# Patient Record
Sex: MALE | Race: WHITE | Employment: STUDENT | URBAN - METROPOLITAN AREA
[De-identification: names, ages, dates, MRNs, and addresses within clinical notes are randomized per-mention and may not be internally consistent; named-entity substitution may affect disease eponyms.]

---

## 2021-10-21 ENCOUNTER — HOSPITAL ENCOUNTER (EMERGENCY)
Age: 21
Discharge: HOME | End: 2021-10-21
Attending: EMERGENCY MEDICINE
Payer: COMMERCIAL

## 2021-10-21 ENCOUNTER — ANESTHESIA EVENT (OUTPATIENT)
Dept: ENDOSCOPY | Age: 21
End: 2021-10-21
Payer: COMMERCIAL

## 2021-10-21 ENCOUNTER — ANESTHESIA (OUTPATIENT)
Dept: ENDOSCOPY | Age: 21
End: 2021-10-21
Payer: COMMERCIAL

## 2021-10-21 VITALS
TEMPERATURE: 97.5 F | DIASTOLIC BLOOD PRESSURE: 76 MMHG | BODY MASS INDEX: 24.38 KG/M2 | RESPIRATION RATE: 14 BRPM | HEART RATE: 71 BPM | HEIGHT: 72 IN | OXYGEN SATURATION: 100 % | SYSTOLIC BLOOD PRESSURE: 122 MMHG | WEIGHT: 180 LBS

## 2021-10-21 VITALS — DIASTOLIC BLOOD PRESSURE: 71 MMHG | OXYGEN SATURATION: 94 % | SYSTOLIC BLOOD PRESSURE: 133 MMHG

## 2021-10-21 DIAGNOSIS — K20.90 ESOPHAGITIS: Primary | ICD-10-CM

## 2021-10-21 DIAGNOSIS — K44.9 HIATAL HERNIA: ICD-10-CM

## 2021-10-21 PROCEDURE — 7100000000 HC PACU RECOVERY - FIRST 15 MIN: Performed by: INTERNAL MEDICINE

## 2021-10-21 PROCEDURE — 2500000003 HC RX 250 WO HCPCS: Performed by: PHYSICIAN ASSISTANT

## 2021-10-21 PROCEDURE — 3700000000 HC ANESTHESIA ATTENDED CARE: Performed by: INTERNAL MEDICINE

## 2021-10-21 PROCEDURE — C1713 ANCHOR/SCREW BN/BN,TIS/BN: HCPCS | Performed by: INTERNAL MEDICINE

## 2021-10-21 PROCEDURE — 7100000001 HC PACU RECOVERY - ADDTL 15 MIN: Performed by: INTERNAL MEDICINE

## 2021-10-21 PROCEDURE — 2580000003 HC RX 258: Performed by: ANESTHESIOLOGY

## 2021-10-21 PROCEDURE — 96374 THER/PROPH/DIAG INJ IV PUSH: CPT

## 2021-10-21 PROCEDURE — 3609012900 HC EGD FOREIGN BODY REMOVAL: Performed by: INTERNAL MEDICINE

## 2021-10-21 PROCEDURE — 3700000001 HC ADD 15 MINUTES (ANESTHESIA): Performed by: INTERNAL MEDICINE

## 2021-10-21 PROCEDURE — 6360000002 HC RX W HCPCS: Performed by: ANESTHESIOLOGY

## 2021-10-21 PROCEDURE — 99284 EMERGENCY DEPT VISIT MOD MDM: CPT

## 2021-10-21 RX ORDER — OXYCODONE HYDROCHLORIDE AND ACETAMINOPHEN 5; 325 MG/1; MG/1
1 TABLET ORAL
Status: DISCONTINUED | OUTPATIENT
Start: 2021-10-21 | End: 2021-10-21 | Stop reason: HOSPADM

## 2021-10-21 RX ORDER — SODIUM CHLORIDE 9 MG/ML
INJECTION, SOLUTION INTRAVENOUS CONTINUOUS PRN
Status: DISCONTINUED | OUTPATIENT
Start: 2021-10-21 | End: 2021-10-21 | Stop reason: SDUPTHER

## 2021-10-21 RX ORDER — DEXAMETHASONE SODIUM PHOSPHATE 4 MG/ML
INJECTION, SOLUTION INTRA-ARTICULAR; INTRALESIONAL; INTRAMUSCULAR; INTRAVENOUS; SOFT TISSUE PRN
Status: DISCONTINUED | OUTPATIENT
Start: 2021-10-21 | End: 2021-10-21 | Stop reason: SDUPTHER

## 2021-10-21 RX ORDER — HYDRALAZINE HYDROCHLORIDE 20 MG/ML
5 INJECTION INTRAMUSCULAR; INTRAVENOUS EVERY 10 MIN PRN
Status: DISCONTINUED | OUTPATIENT
Start: 2021-10-21 | End: 2021-10-21 | Stop reason: HOSPADM

## 2021-10-21 RX ORDER — MIDAZOLAM HYDROCHLORIDE 1 MG/ML
INJECTION INTRAMUSCULAR; INTRAVENOUS PRN
Status: DISCONTINUED | OUTPATIENT
Start: 2021-10-21 | End: 2021-10-21 | Stop reason: SDUPTHER

## 2021-10-21 RX ORDER — SUCCINYLCHOLINE CHLORIDE 20 MG/ML
INJECTION INTRAMUSCULAR; INTRAVENOUS PRN
Status: DISCONTINUED | OUTPATIENT
Start: 2021-10-21 | End: 2021-10-21 | Stop reason: SDUPTHER

## 2021-10-21 RX ORDER — PROMETHAZINE HYDROCHLORIDE 25 MG/ML
6.25 INJECTION, SOLUTION INTRAMUSCULAR; INTRAVENOUS
Status: DISCONTINUED | OUTPATIENT
Start: 2021-10-21 | End: 2021-10-21 | Stop reason: HOSPADM

## 2021-10-21 RX ORDER — FENTANYL CITRATE 50 UG/ML
25 INJECTION, SOLUTION INTRAMUSCULAR; INTRAVENOUS EVERY 5 MIN PRN
Status: DISCONTINUED | OUTPATIENT
Start: 2021-10-21 | End: 2021-10-21 | Stop reason: HOSPADM

## 2021-10-21 RX ORDER — ONDANSETRON 2 MG/ML
INJECTION INTRAMUSCULAR; INTRAVENOUS PRN
Status: DISCONTINUED | OUTPATIENT
Start: 2021-10-21 | End: 2021-10-21 | Stop reason: SDUPTHER

## 2021-10-21 RX ORDER — FENTANYL CITRATE 50 UG/ML
50 INJECTION, SOLUTION INTRAMUSCULAR; INTRAVENOUS EVERY 5 MIN PRN
Status: DISCONTINUED | OUTPATIENT
Start: 2021-10-21 | End: 2021-10-21 | Stop reason: HOSPADM

## 2021-10-21 RX ORDER — MEPERIDINE HYDROCHLORIDE 25 MG/ML
12.5 INJECTION INTRAMUSCULAR; INTRAVENOUS; SUBCUTANEOUS EVERY 5 MIN PRN
Status: DISCONTINUED | OUTPATIENT
Start: 2021-10-21 | End: 2021-10-21 | Stop reason: HOSPADM

## 2021-10-21 RX ORDER — ONDANSETRON 2 MG/ML
4 INJECTION INTRAMUSCULAR; INTRAVENOUS
Status: DISCONTINUED | OUTPATIENT
Start: 2021-10-21 | End: 2021-10-21 | Stop reason: HOSPADM

## 2021-10-21 RX ORDER — PROPOFOL 10 MG/ML
INJECTION, EMULSION INTRAVENOUS PRN
Status: DISCONTINUED | OUTPATIENT
Start: 2021-10-21 | End: 2021-10-21 | Stop reason: SDUPTHER

## 2021-10-21 RX ORDER — PANTOPRAZOLE SODIUM 40 MG/1
40 TABLET, DELAYED RELEASE ORAL
Qty: 90 TABLET | Refills: 3 | Status: SHIPPED | OUTPATIENT
Start: 2021-10-21

## 2021-10-21 RX ORDER — LABETALOL HYDROCHLORIDE 5 MG/ML
5 INJECTION, SOLUTION INTRAVENOUS EVERY 10 MIN PRN
Status: DISCONTINUED | OUTPATIENT
Start: 2021-10-21 | End: 2021-10-21 | Stop reason: HOSPADM

## 2021-10-21 RX ADMIN — DEXAMETHASONE SODIUM PHOSPHATE 4 MG: 4 INJECTION, SOLUTION INTRAMUSCULAR; INTRAVENOUS at 17:44

## 2021-10-21 RX ADMIN — SODIUM CHLORIDE: 9 INJECTION, SOLUTION INTRAVENOUS at 17:26

## 2021-10-21 RX ADMIN — ONDANSETRON 4 MG: 2 INJECTION INTRAMUSCULAR; INTRAVENOUS at 17:44

## 2021-10-21 RX ADMIN — PROPOFOL 200 MG: 10 INJECTION, EMULSION INTRAVENOUS at 17:30

## 2021-10-21 RX ADMIN — GLUCAGON HYDROCHLORIDE 1 MG: KIT at 15:12

## 2021-10-21 RX ADMIN — MIDAZOLAM HYDROCHLORIDE 2 MG: 2 INJECTION, SOLUTION INTRAMUSCULAR; INTRAVENOUS at 17:27

## 2021-10-21 RX ADMIN — SUCCINYLCHOLINE CHLORIDE 80 MG: 20 INJECTION, SOLUTION INTRAMUSCULAR; INTRAVENOUS; PARENTERAL at 17:30

## 2021-10-21 ASSESSMENT — PULMONARY FUNCTION TESTS
PIF_VALUE: 12
PIF_VALUE: 3
PIF_VALUE: 17
PIF_VALUE: 1
PIF_VALUE: 15
PIF_VALUE: 1
PIF_VALUE: 1
PIF_VALUE: 23
PIF_VALUE: 2
PIF_VALUE: 18
PIF_VALUE: 14
PIF_VALUE: 16
PIF_VALUE: 1
PIF_VALUE: 25
PIF_VALUE: 15
PIF_VALUE: 15
PIF_VALUE: 5
PIF_VALUE: 11
PIF_VALUE: 5
PIF_VALUE: 2
PIF_VALUE: 10
PIF_VALUE: 15
PIF_VALUE: 18
PIF_VALUE: 18
PIF_VALUE: 5
PIF_VALUE: 16

## 2021-10-21 ASSESSMENT — PAIN SCALES - GENERAL
PAINLEVEL_OUTOF10: 2
PAINLEVEL_OUTOF10: 0

## 2021-10-21 ASSESSMENT — ENCOUNTER SYMPTOMS
ABDOMINAL PAIN: 0
DIARRHEA: 0
SHORTNESS OF BREATH: 0
VOMITING: 0
NAUSEA: 0
BACK PAIN: 0
COUGH: 0

## 2021-10-21 ASSESSMENT — PAIN DESCRIPTION - DESCRIPTORS: DESCRIPTORS: DISCOMFORT

## 2021-10-21 ASSESSMENT — PAIN DESCRIPTION - LOCATION: LOCATION: THROAT

## 2021-10-21 ASSESSMENT — PAIN DESCRIPTION - FREQUENCY: FREQUENCY: CONTINUOUS

## 2021-10-21 NOTE — PROCEDURES
[address] [facility]  Endoscopy Note    Patient: Andrew Caballero  : 2000  Acct#:     Procedure: Esophagogastroduodenoscopy with foreign body removal    Date:  10/21/2021     Surgeon:  Declan Garcia MD      Anesthesia:  General       EBL: <50 mL    Indications: food impaction, esophageal dysphagia    Description of Procedure:    Informed consent was obtained from the patient after explanation of indications, benefits and possible risks and complications of the procedure. The patient was then taken to the endoscopy suite, placed in the left lateral decubitus position and the above IV sedation was administrered. The Olympus videoendoscope (GIF-H190) was placed in the patient's mouth and under direct visualization passed into the esophagus. The scope was then advanced into the stomach and to the second portion of the duodenum. A retroflexed exam of the gastric cardia and fundus was performed. The scope was then withdrawn back into the stomach, it was decompressed, and the scope was completely withdrawn. Findings:  Normal 1st and 2nd portion of the duodenum. Small sliding hiatal hernia. Food bolus (meat) in the lower esophagus removed by placing banding velazco over the tip of the scope. Grade D reflux esophagitis. No features of Ontiveros's esophagus or eosinophilic esophagitis. The patient tolerated the procedure well and was taken to the post anesthesia care unit in good condition. Biopsies: No     Impression:    1. Normal 1st and 2nd portion of the duodenum. 2.  Small sliding hiatal hernia. 3.  Food bolus (meat) in the lower esophagus removed by placing banding velazco over the tip of the scope. 4.  Grade D reflux esophagitis. No features of Ontiveros's esophagus or eosinophilic esophagitis. Recommendations:   1. Pantoprazole 40mg qdam indefinitely. 2. Cut meat into small pieces until esophagus heals.        Declan Garcia MD  [facility]

## 2021-10-21 NOTE — ED NOTES
Bed: A12-12  Expected date:   Expected time:   Means of arrival:   Comments:  1442 Rasta Fermin RN  10/21/21 6292

## 2021-10-21 NOTE — H&P
Gastroenterology Note                 Pre-operative History and Physical    Patient: Raman Warren  : 2000  CSN:     History Obtained From:   Patient or guardian. HISTORY OF PRESENT ILLNESS:    The patient is a 23 y.o. male here for EGD for food impaction and dysphagia. Past Medical History:    Past Medical History:   Diagnosis Date    Asthma     Influenza B 11/15/2016     Past Surgical History:    History reviewed. No pertinent surgical history. Medications Prior to Admission:   No current facility-administered medications on file prior to encounter. Current Outpatient Medications on File Prior to Encounter   Medication Sig Dispense Refill    acetaminophen (TYLENOL) 325 MG tablet Take 2 tablets by mouth every 4 hours as needed for Pain or Fever 120 tablet 0    budesonide-formoterol (SYMBICORT) 80-4.5 MCG/ACT AERO Inhale 2 puffs into the lungs 2 times daily      pseudoephedrine (SUDAFED) 60 MG tablet Take 60 mg by mouth every 4 hours as needed for Congestion      Loratadine (CLARITIN) 10 MG CAPS Take 10 mg by mouth          Allergies:  Patient has no known allergies. Social History:   Social History     Tobacco Use    Smoking status: Never Smoker   Substance Use Topics    Alcohol use: No     Family History:   History reviewed. No pertinent family history. PHYSICAL EXAM:      /75   Pulse 76   Temp 97.8 °F (36.6 °C) (Oral)   Resp 18   Ht 6' (1.829 m)   Wt 180 lb (81.6 kg)   SpO2 98%   BMI 24.41 kg/m²  I        Heart:  RRR, normal s1s2    Lungs:  CTA and normal effort    Abdomen:   Soft, nt nd. ASSESSMENT AND PLAN:    ASA 1  Mallapati: 1     1. Patient is a 23 y.o. male here for endoscopy with MAC sedation. 2.  Procedure options, risks and benefits reviewed with patient and/or guardian. They express understanding.     Stevie Patel MD  [facility] Biswas

## 2021-10-21 NOTE — ED PROVIDER NOTES
[address] [facility] [address] ENCOUNTER          NURSE PRACTITIONER NOTE       Date of evaluation: 10/21/2021    ADDENDUM:      Care of this patient was assumed from [place], [place]. The patient was seen for Dysphagia  . The patient's initial evaluation and plan have been discussed with the prior provider who initially evaluated the patient. Nursing Notes, Past Medical Hx, Past Surgical Hx, Social Hx, Allergies, and Family Hx were all reviewed. Diagnostic Results       RADIOLOGY:  No orders to display       LABS:   No results found for this visit on 10/21/21. RECENT VITALS:  BP: 122/76, Temp: 97.5 °F (36.4 °C), Pulse: 71, Resp: 14, SpO2: 100 %       ED Course     The patient was given the following medications:  Orders Placed This Encounter   Medications    glucagon (rDNA) injection 1 mg    DISCONTD: fentaNYL (SUBLIMAZE) injection 25 mcg    DISCONTD: fentaNYL (SUBLIMAZE) injection 50 mcg    DISCONTD: HYDROmorphone (DILAUDID) injection 0.25 mg    DISCONTD: HYDROmorphone (DILAUDID) injection 0.5 mg    DISCONTD: oxyCODONE-acetaminophen (PERCOCET) 5-325 MG per tablet 1 tablet    DISCONTD: ondansetron (ZOFRAN) injection 4 mg    DISCONTD: promethazine (PHENERGAN) injection 6.25 mg    DISCONTD: labetalol (NORMODYNE;TRANDATE) injection 5 mg    DISCONTD: hydrALAZINE (APRESOLINE) injection 5 mg    DISCONTD: meperidine (DEMEROL) injection 12.5 mg    pantoprazole (PROTONIX) 40 MG tablet     Sig: Take 1 tablet by mouth every morning (before breakfast)     Dispense:  90 tablet     Refill:  3            CONSULTS:  [address] [facility] / ASSESSMENT / Nicole Antonio is a 23 y.o. male with a complaint of a food bolus. Please see previous providers note for HPI and initial MDM. Patient was turned over to me pending EGD by GI for further evaluation and management.   Patient did have a food bolus \"meat\" in the lower esophagus that was removed during EGD, also noted a small sliding hiatal hernia and grade D reflux esophagitis. GI recommended pantoprazole 40 mg every morning indefinitely. Patient provided with this prescription, as well as follow-up information and return precautions. Discharged in stable condition. This patient was also evaluated by the attending physician. All care plans were discussed and agreed upon. Clinical Impression     1. Esophagitis    2.  Hiatal hernia        Disposition     PATIENT REFERRED TO:  Ruth Montoya MD  [facility]  [address] [place] [id number]  [phone]      in 1-2 weeks      DISCHARGE MEDICATIONS:  Discharge Medication List as of 10/21/2021  7:22 PM      START taking these medications    Details   pantoprazole (PROTONIX) 40 MG tablet Take 1 tablet by mouth every morning (before breakfast), Disp-90 tablet, R-3Normal             DISPOSITION Decision To Discharge 10/21/2021 07:12:40 PM          WALLACE Lake - CNP  10/22/21 0012

## 2021-10-21 NOTE — ED TRIAGE NOTES
Pt was eating steak this morning at 1130 and states he feels it \"stuck in the bottom of my throat. \" Pain when swallowing, not able to drink or eat anything

## 2021-10-21 NOTE — ED PROVIDER NOTES
ED Attending Attestation Note     Date of evaluation: 10/21/2021    This patient was seen by the advance practice provider. I have seen and examined the patient, agree with the workup, evaluation, management and diagnosis. The care plan has been discussed. My assessment reveals patient with sensation that a piece of steak is stuck in his esophagus. Glucagon administered possibly with some minimal improvement but patient felt as if he still had food stuck in his esophagus. GI was contacted for EGD and the patient went for EGD and did have a finding of impacted meat which was pushed through. The patient return to the emergency department and once fully recovered from anesthesia was discharged home.      Abdon Dillard MD  10/21/21 8816

## 2021-10-21 NOTE — ED PROVIDER NOTES
[address] [facility] [address] ENCOUNTER          PHYSICIAN ASSISTANT NOTE       Date of evaluation: 10/21/2021    Chief Complaint     Dysphagia      History of Present Illness     Melanie Farr is a 23 y.o. male who presents to the emergency room with foreign body sensation. Patient states that approximately 1130 he was eating a steak for lunch. alf through his steak he felt that something is stuck in the midportion of his chest.  He states this is happened multiple times to him in the past, most recently earlier this year. He has never had to be evaluated for the symptoms. He states typically he is able to drink water and his symptoms resolve, occasionally he has to vomit. He states he was unable to monitor today. He states that a few minutes after drinking water, he  has been vomiting back up. He denies any pain throughout his abdomen. He states he was feeling his normal self prior to eating lunch today. Review of Systems     Review of Systems   Constitutional: Negative for activity change, chills and fever. HENT: Negative for congestion. Eyes: Negative for visual disturbance. Respiratory: Negative for cough and shortness of breath. Cardiovascular: Negative for chest pain. Foreign body feeling in chest   Gastrointestinal: Negative for abdominal pain, diarrhea, nausea and vomiting. Genitourinary: Negative for difficulty urinating. Musculoskeletal: Negative for back pain. Neurological: Negative for dizziness and headaches. Psychiatric/Behavioral: Negative for agitation. Past Medical, Surgical, Family, and Social History     He has a past medical history of Asthma and Influenza B. He has no past surgical history on file. His family history is not on file. He reports that he has never smoked. He does not have any smokeless tobacco history on file. He reports that he does not drink alcohol and does not use drugs.     Medications     Previous Medications ACETAMINOPHEN (TYLENOL) 325 MG TABLET    Take 2 tablets by mouth every 4 hours as needed for Pain or Fever    BUDESONIDE-FORMOTEROL (SYMBICORT) 80-4.5 MCG/ACT AERO    Inhale 2 puffs into the lungs 2 times daily    LORATADINE (CLARITIN) 10 MG CAPS    Take 10 mg by mouth    PSEUDOEPHEDRINE (SUDAFED) 60 MG TABLET    Take 60 mg by mouth every 4 hours as needed for Congestion       Allergies     He has No Known Allergies. Physical Exam     INITIAL VITALS: BP: 133/87, Temp: 97.8 °F (36.6 °C), Pulse: 67, Resp: 18, SpO2: 100 %  Physical Exam  Constitutional:       Appearance: Normal appearance. HENT:      Head: Normocephalic and atraumatic. Mouth/Throat:      Mouth: Mucous membranes are moist.   Eyes:      Pupils: Pupils are equal, round, and reactive to light. Cardiovascular:      Rate and Rhythm: Normal rate and regular rhythm. Pulmonary:      Effort: Pulmonary effort is normal. No respiratory distress. Breath sounds: Normal breath sounds. Abdominal:      General: There is no distension. Palpations: Abdomen is soft. Tenderness: There is no abdominal tenderness. Musculoskeletal:         General: Normal range of motion. Cervical back: Normal range of motion. Skin:     General: Skin is warm and dry. Neurological:      General: No focal deficit present. Mental Status: He is alert and oriented to person, place, and time. Psychiatric:         Mood and Affect: Mood normal.         Behavior: Behavior normal.         Diagnostic Results       RADIOLOGY:  No orders to display       LABS:   No results found for this visit on 10/21/21. RECENT VITALS:  BP: 127/75, Temp: 97.8 °F (36.6 °C), Pulse: 76, Resp: 18, SpO2: 98 %     ED Course     Nursing Notes, Past Medical Hx,Past Surgical Hx, Social Hx, Allergies, and Family Hx were reviewed.     The patient was given the following medications:  Orders Placed This Encounter   Medications    glucagon (rDNA) injection 1 mg       CONSULTS:  IP CONSULT TO GI    MEDICAL DECISION MAKING / ASSESSMENT / Kajal Viraj is a 23 y.o. male who presents the emergency room due to concern for foreign body in esophagus. Vital signs stable on presentation remained stable throughout his stay. Thorough history and physical exam performed in detail above. Patient presents the emergency room due to concern for food bolus. He states he was eating approximate 1130 when he felt something is stuck in his throat. He states this is happened multiple times in the past, however is typically passes with water. He states the discomfort is right behind his sternum. He is attempted to drink water, however ultimately vomits it back up. On physical exam heart rate and rhythm regular. Abdomen soft and nontender in all quadrants. I did give the patient water which she is able to take several sips of. He states that after he left the room he vomited back up. I gave the patient a carbonated drink had him strike his heels against the ground in attempt to pass the foreign body, this was unsuccessful. He was given 1 mg glucagon IV which made him feel mildly nauseated for a short amount of time, however he continues to have a foreign body sensation. Patient is able to sip some water and feels that something is going around the foreign body, however still feels something is stuck in his esophagus. Given concern for food bolus, I did consult GI. They state that they will come in for endoscopy. At this time, I am going off service. Bruno Whatley NP will be assuming care of this patient. Her responsibilities include: Follow-up after endoscopy, reassessment, disposition    This patient was also evaluated by the attending physician. All care plans were discussed and agreed upon. Disposition     PATIENT REFERRED TO:  No follow-up provider specified.     DISCHARGE MEDICATIONS:  New Prescriptions    No medications on file       DISPOSITION  Pending at turnover Sander Carr, PA-C  10/21/21 8104

## 2021-10-21 NOTE — ED NOTES
Bed: A12-12  Expected date: 10/21/21  Expected time: 6:00 PM  Means of arrival:   Comments:  Pt in [facility]  10/21/21 1847

## 2021-10-21 NOTE — ANESTHESIA PRE PROCEDURE
Department of Anesthesiology  Preprocedure Note       Name:  Jak Sorenson   Age:  23 y.o.  :  2000                                          MRN:  [id number]         Date:  10/21/2021      Surgeon: Yoel Del Cid):  Berna Ta MD    Procedure: Procedure(s):  EGD ESOPHAGOGASTRODUODENOSCOPY    Medications prior to admission:   Prior to Admission medications    Medication Sig Start Date End Date Taking? Authorizing Provider   acetaminophen (TYLENOL) 325 MG tablet Take 2 tablets by mouth every 4 hours as needed for Pain or Fever 16   WALLACE Salinas - CHARITY   budesonide-formoterol (SYMBICORT) 80-4.5 MCG/ACT AERO Inhale 2 puffs into the lungs 2 times daily    Historical Provider, MD   pseudoephedrine (SUDAFED) 60 MG tablet Take 60 mg by mouth every 4 hours as needed for Congestion    Historical Provider, MD   Loratadine (CLARITIN) 10 MG CAPS Take 10 mg by mouth    Historical Provider, MD       Current medications:    No current facility-administered medications for this encounter. Allergies:  No Known Allergies    Problem List:    Patient Active Problem List   Diagnosis Code    Influenza B J10.1    SIRS (systemic inflammatory response syndrome) ([facility]) R65.10    CAP (community acquired pneumonia) J18.9    Asthma J45.909       Past Medical History:        Diagnosis Date    Asthma     Influenza B 11/15/2016       Past Surgical History:  History reviewed. No pertinent surgical history. Social History:    Social History     Tobacco Use    Smoking status: Never Smoker   Substance Use Topics    Alcohol use:  No                                Counseling given: Not Answered      Vital Signs (Current):   Vitals:    10/21/21 1416 10/21/21 1516 10/21/21 1600   BP: 133/87 138/89 127/75   Pulse: 67 74 76   Resp: 18 16 18   Temp: 97.8 °F (36.6 °C)     TempSrc: Oral     SpO2: 100% 100% 98%   Weight: 180 lb (81.6 kg)     Height: 6' (1.829 m)                                                BP Readings from Last 3 Encounters:   10/21/21 127/75   10/21/21 128/69   11/16/16 121/70 (62 %, Z = 0.30 /  55 %, Z = 0.12)*     *BP percentiles are based on the 2017 AAP Clinical Practice Guideline for boys       NPO Status: Time of last liquid consumption: 1630                        Time of last solid consumption: 1130                        Date of last liquid consumption: 10/21/21                        Date of last solid food consumption: 10/21/21    BMI:   Wt Readings from Last 3 Encounters:   10/21/21 180 lb (81.6 kg)   11/16/16 164 lb 0.4 oz (74.4 kg) (78 %, Z= 0.79)*     * Growth percentiles are based on [facility] (Boys, 2-20 Years) data. Body mass index is 24.41 kg/m². CBC:   Lab Results   Component Value Date    WBC 5.9 11/16/2016    RBC 4.80 11/16/2016    HGB 13.5 11/16/2016    HCT 40.4 11/16/2016    MCV 84.4 11/16/2016    RDW 13.1 11/16/2016     11/16/2016       CMP:   Lab Results   Component Value Date     11/16/2016    K 3.8 11/16/2016     11/16/2016    CO2 25 11/16/2016    BUN 14 11/16/2016    CREATININE 1.0 11/16/2016    GFRAA >60 11/16/2016    AGRATIO 1.7 11/15/2016    LABGLOM >60 11/16/2016    GLUCOSE 74 11/16/2016    PROT 7.5 11/15/2016    CALCIUM 8.2 11/16/2016    BILITOT 0.5 11/15/2016    ALKPHOS 149 11/15/2016    AST 18 11/15/2016    ALT 11 11/15/2016       POC Tests: No results for input(s): POCGLU, POCNA, POCK, POCCL, POCBUN, POCHEMO, POCHCT in the last 72 hours.     Coags: No results found for: PROTIME, INR, APTT    HCG (If Applicable): No results found for: PREGTESTUR, PREGSERUM, HCG, HCGQUANT     ABGs: No results found for: PHART, PO2ART, WEI0MKV, BPB8OIH, BEART, Y0DCKWEG     Type & Screen (If Applicable):  No results found for: LABABO, LABRH    Drug/Infectious Status (If Applicable):  No results found for: HIV, HEPCAB    COVID-19 Screening (If Applicable): No results found for: COVID19        Anesthesia Evaluation  Patient summary reviewed and Nursing notes reviewed no history of anesthetic complications:   Airway: Mallampati: I  TM distance: >3 FB   Neck ROM: full  Mouth opening: > = 3 FB Dental: normal exam         Pulmonary:   (+) asthma:                            Cardiovascular:  Exercise tolerance: good (>4 METS),           Rhythm: regular  Rate: normal                    Neuro/Psych:   Negative Neuro/Psych ROS              GI/Hepatic/Renal:             Endo/Other: Negative Endo/Other ROS                    Abdominal:             Vascular: negative vascular ROS. Other Findings:             Anesthesia Plan      general     ASA 2 - emergent       Induction: intravenous. MIPS: Prophylactic antiemetics administered. Anesthetic plan and risks discussed with patient. Plan discussed with CRNA.                   Ted Banegas DO   10/21/2021

## 2021-10-21 NOTE — ED NOTES
Spoke with [place] Para from Endoscopy face to face, pt will be going to EGD procedure now and will return to ED between [address] and [address], [address], RN  10/21/21 2696

## 2021-10-21 NOTE — ED NOTES
Pt back to ED from endoscopy.  Resting peacefully in bed      Matilde Pinedo [facility]  10/21/21 1317

## (undated) DEVICE — SIX SHOOTER SAEED MULTI-BAND LIGATOR: Brand: SAEED